# Patient Record
Sex: FEMALE
[De-identification: names, ages, dates, MRNs, and addresses within clinical notes are randomized per-mention and may not be internally consistent; named-entity substitution may affect disease eponyms.]

---

## 2020-10-15 ENCOUNTER — DASHBOARD ENCOUNTERS (OUTPATIENT)
Age: 25
End: 2020-10-15

## 2020-10-15 ENCOUNTER — OFFICE VISIT (OUTPATIENT)
Dept: URBAN - METROPOLITAN AREA TELEHEALTH 2 | Facility: TELEHEALTH | Age: 25
End: 2020-10-15
Payer: COMMERCIAL

## 2020-10-15 DIAGNOSIS — K92.1 HEMATOCHEZIA: ICD-10-CM

## 2020-10-15 DIAGNOSIS — R19.5 MUCOUS IN STOOLS: ICD-10-CM

## 2020-10-15 PROCEDURE — G8420 CALC BMI NORM PARAMETERS: HCPCS | Performed by: INTERNAL MEDICINE

## 2020-10-15 PROCEDURE — 99204 OFFICE O/P NEW MOD 45 MIN: CPT | Performed by: INTERNAL MEDICINE

## 2020-10-15 NOTE — HPI-TODAY'S VISIT:
The patient is a 26 yo female who is complaining of mucous in the stool and certain spots of blood in the mucous.  This has happened 2x in the past 2 months.  Denies abdominal pain,  denies any changes in stool habits/caliber.  No family of colon cancer but does have a family history of polyps.